# Patient Record
Sex: FEMALE | Race: ASIAN
[De-identification: names, ages, dates, MRNs, and addresses within clinical notes are randomized per-mention and may not be internally consistent; named-entity substitution may affect disease eponyms.]

---

## 2019-09-26 ENCOUNTER — HOSPITAL ENCOUNTER (OUTPATIENT)
Dept: HOSPITAL 5 - MAMMO | Age: 49
Discharge: HOME | End: 2019-09-26
Attending: INTERNAL MEDICINE
Payer: COMMERCIAL

## 2019-09-26 DIAGNOSIS — N60.02: ICD-10-CM

## 2019-09-26 DIAGNOSIS — E21.3: ICD-10-CM

## 2019-09-26 DIAGNOSIS — N60.01: Primary | ICD-10-CM

## 2019-09-26 PROCEDURE — 76641 ULTRASOUND BREAST COMPLETE: CPT

## 2019-09-26 PROCEDURE — A9500 TC99M SESTAMIBI: HCPCS

## 2019-09-26 PROCEDURE — 78070 PARATHYROID PLANAR IMAGING: CPT

## 2019-09-26 PROCEDURE — 77066 DX MAMMO INCL CAD BI: CPT

## 2019-09-26 NOTE — NUCLEAR MEDICINE REPORT
NM parathyroid scan



INDICATION:

HYPERPARATHYROID.



TECHNIQUE: 

21.2 mCi of technetium 99m leveled sestamibi is administered



COMPARISON: 

No relevant prior imaging study available.



FINDINGS:

No definite abnormal uptake of tracer is seen. There is uptake noted in the thyroid on the initial im
aging. There is small amount residual activity in the thyroid on delayed images no discrete parathyro
id adenomas identified.



IMPRESSION:

1. No significant scintigraphic abnormality.. No parathyroid adenoma is identified.



Signer Name: Simon Urena MD 

Signed: 9/26/2019 3:03 PM

 Workstation Name: [id number]

## 2019-09-26 NOTE — MAMMOGRAPHY REPORT
BILATERAL DIGITAL DIAGNOSTIC MAMMOGRAM WITH CAD  -- 9/26/2019

BILATERAL COMPLETE BREAST ULTRASOUND

 

INDICATION: Bilateral breast pain. Previously confirmed bilateral cysts.



TECHNIQUE:  Digital bilateral mammographic imaging was performed.  This examination was interpreted w
ith the benefit of Computer-Aided Detection (CAD) analysis. 



COMPARISON: 12/10/2018



FINDINGS: 



Breast Density: The breasts are heterogeneously dense, which may obscure small masses.



MAMMOGRAPHIC FINDINGS: There is no evidence of dominant mass, suspicious calcifications or architectu
ral distortion in either breast. A few scattered bilateral benign calcifications.



ULTRASOUND FINDINGS: Complete sonographic evaluation of all 4 quadrants and retroareolar region was p
erformed.   Ultrasound demonstrated multiple bilateral benign cysts and no solid mass.



IMPRESSION: Bilateral benign cysts and no suspicious finding.



Follow up recommendation: Routine yearly



BI-RADS Category 2:  Benign.





A "normal" or negative report should not discourage follow up or biopsy of a clinically significant f
inding.



A written summary of these findings will be mailed to the patient. The patient will be entered into a
 mammography reporting system which will generate a reminder letter for the patient's next appointmen
t at the appropriate interval.



According to the [employer], yearly mammograms are recommended starting at age 40 
and continuing as long as a woman is in good health.  Breast MRI is recommended for women with an anali
roximately 20-25% or greater lifetime risk of breast cancer, including women with a strong family his
tory of breast or ovarian cancer and women who have been treated for Hodgkin's disease.



Signer Name: Roscoe Gramajo MD 

Signed: 9/26/2019 1:11 PM

 Workstation Name: [id number]

## 2019-09-26 NOTE — ULTRASOUND REPORT
BILATERAL DIGITAL DIAGNOSTIC MAMMOGRAM WITH CAD  -- 9/26/2019

BILATERAL COMPLETE BREAST ULTRASOUND

 

INDICATION: Bilateral breast pain. Previously confirmed bilateral cysts.



TECHNIQUE:  Digital bilateral mammographic imaging was performed.  This examination was interpreted w
ith the benefit of Computer-Aided Detection (CAD) analysis. 



COMPARISON: 12/10/2018



FINDINGS: 



Breast Density: The breasts are heterogeneously dense, which may obscure small masses.



MAMMOGRAPHIC FINDINGS: There is no evidence of dominant mass, suspicious calcifications or architectu
ral distortion in either breast. A few scattered bilateral benign calcifications.



ULTRASOUND FINDINGS: Complete sonographic evaluation of all 4 quadrants and retroareolar region was p
erformed.   Ultrasound demonstrated multiple bilateral benign cysts and no solid mass.



IMPRESSION: Bilateral benign cysts and no suspicious finding.



Follow up recommendation: Routine yearly



BI-RADS Category 2:  Benign.





A "normal" or negative report should not discourage follow up or biopsy of a clinically significant f
inding.



A written summary of these findings will be mailed to the patient. The patient will be entered into a
 mammography reporting system which will generate a reminder letter for the patient's next appointmen
t at the appropriate interval.



According to the [employer], yearly mammograms are recommended starting at age 40 
and continuing as long as a woman is in good health.  Breast MRI is recommended for women with an anali
roximately 20-25% or greater lifetime risk of breast cancer, including women with a strong family his
tory of breast or ovarian cancer and women who have been treated for Hodgkin's disease.



Signer Name: Roscoe Gramajo MD 

Signed: 9/26/2019 1:12 PM

 Workstation Name: [id number]

## 2021-10-25 NOTE — MAMMOGRAPHY REPORT
DIGITAL SCREENING MAMMOGRAM WITH CAD WITH TOMOSYNTHESIS, 10/25/2021



CLINICAL INFORMATION / INDICATION: Routine screening mammography.



TECHNIQUE:  Digital bilateral 2D and 3D mammography was obtained in the craniocaudal and mediolateral
 oblique projections. This examination was interpreted with the benefit of Computer-Aided Detection a
nalysis.



COMPARISON: 9/26/2019, 12/10/2018



FINDINGS: 



Breast Density: The breasts are extremely dense, which lowers the sensitivity of mammography.



No dominant mass, suspicious calcifications, or architectural distortion in either breast. 





 

IMPRESSION: No mammographic evidence of malignancy.



Follow up recommendation: Routine yearly



BI-RADS Category 1:  Negative.





-------------------------------------------------------------------------------------------

A "normal" or negative report should not discourage follow up or biopsy of a clinically significant f
inding.



A written summary of these findings will be mailed to the patient. The patient will be entered into a
 mammography reporting system which will generate a reminder letter for the patient's next appointmen
t at the appropriate interval.



The [employer] recommends yearly mammograms starting at age 40 and continuing as l
cale as a woman is in good health.  Breast MRI is recommended for women with an approximate 20-25% or 
greater lifetime risk of breast cancer, including women with a strong family history of breast or ova
talya cancer or who have been treated for Hodgkin's disease.



Signer Name: Dayanna Anderson MD 

Signed: 10/25/2021 11:46 AM

Workstation Name: [id number]

## 2021-10-25 NOTE — MAMMOGRAPHY REPORT
DIGITAL SCREENING MAMMOGRAM WITH CAD WITH TOMOSYNTHESIS, 10/25/2021



CLINICAL INFORMATION / INDICATION: Routine screening mammography.



TECHNIQUE:  Digital bilateral 2D and 3D mammography was obtained in the craniocaudal and mediolateral
 oblique projections. This examination was interpreted with the benefit of Computer-Aided Detection a
nalysis.



COMPARISON: 9/26/2019, 12/10/2018



FINDINGS: 



Breast Density: The breasts are extremely dense, which lowers the sensitivity of mammography.



No dominant mass, suspicious calcifications, or architectural distortion in either breast. 





 

IMPRESSION: No mammographic evidence of malignancy.



Follow up recommendation: Routine yearly



BI-RADS Category 1:  Negative.





-------------------------------------------------------------------------------------------

A "normal" or negative report should not discourage follow up or biopsy of a clinically significant f
inding.



A written summary of these findings will be mailed to the patient. The patient will be entered into a
 mammography reporting system which will generate a reminder letter for the patient's next appointmen
t at the appropriate interval.



The [employer] recommends yearly mammograms starting at age 40 and continuing as l
cale as a woman is in good health.  Breast MRI is recommended for women with an approximate 20-25% or 
greater lifetime risk of breast cancer, including women with a strong family history of breast or ova
talya cancer or who have been treated for Hodgkin's disease.



Signer Name: Dayanna Anderson MD 

Signed: 10/25/2021 11:46 AM

Workstation Name: [id number]